# Patient Record
Sex: MALE | Race: WHITE | NOT HISPANIC OR LATINO | Employment: FULL TIME | URBAN - NONMETROPOLITAN AREA
[De-identification: names, ages, dates, MRNs, and addresses within clinical notes are randomized per-mention and may not be internally consistent; named-entity substitution may affect disease eponyms.]

---

## 2017-05-11 LAB — HCV AB SER-ACNC: NON REACTIVE

## 2023-04-03 ENCOUNTER — OFFICE VISIT (OUTPATIENT)
Dept: URGENT CARE | Facility: CLINIC | Age: 28
End: 2023-04-03

## 2023-04-03 VITALS
OXYGEN SATURATION: 98 % | HEART RATE: 92 BPM | HEIGHT: 69 IN | SYSTOLIC BLOOD PRESSURE: 121 MMHG | WEIGHT: 145 LBS | BODY MASS INDEX: 21.48 KG/M2 | DIASTOLIC BLOOD PRESSURE: 80 MMHG | TEMPERATURE: 97.4 F

## 2023-04-03 DIAGNOSIS — Z20.822 EXPOSURE TO COVID-19 VIRUS: ICD-10-CM

## 2023-04-03 DIAGNOSIS — B34.9 VIRAL SYNDROME: Primary | ICD-10-CM

## 2023-04-03 LAB
SARS-COV-2 AG UPPER RESP QL IA: NEGATIVE
VALID CONTROL: NORMAL

## 2023-04-03 NOTE — PATIENT INSTRUCTIONS
Rapid POC COVID testing negative  Continue with supportive measures, OTC Tylenol/Ibuprofen, nasal decongestants, and cough suppressants   Cool mist humidifiers, throat lozenges, salt gargles, honey, increased fluid intake and rest   Follow up with PCP in 3-5 days  Present to ER if symptoms worsen     COVID-19 (Coronavirus Disease 2019)   AMBULATORY CARE:   What you need to know about COVID-19:  COVID-19 is the disease caused by a coronavirus first discovered in December 2019  Coronaviruses generally cause upper respiratory (nose, throat, and lung) infections, such as a cold  The 2019 virus spreads quickly and easily  It can be spread starting 2 to 3 days before symptoms even begin  What you need to know about variants: The virus has changed into several new forms (called variants) since it was discovered  The variants may be more contagious (easily spread) than the original form  Some may also cause more severe illness than others  Signs and symptoms of COVID-19  may not develop  Signs and symptoms usually start about 5 days after infection but can take 2 to 14 days  You may feel like you have the flu or a bad cold  Some signs and symptoms go away in a few days  Others can last weeks, months, or possibly years  You may have any of the following:  A cough    Shortness of breath or trouble breathing that may become severe    A fever    Chills that might include shaking    Muscle pain, body aches, or a headache    A sore throat    Sudden changes or loss of your taste or smell    Feeling mentally and physically tired (fatigue)    Congestion (stuffy head and nose), or a runny nose    Diarrhea, nausea, or vomiting    Call your local emergency number (911 in the [facility]) if:   You have trouble breathing or shortness of breath at rest     You have chest pain or pressure that lasts longer than 5 minutes  You become confused or hard to wake  Your lips or face are blue      Seek care immediately if:   You have a fever of 104°F (40°C) or higher  Call your doctor if:   You have symptoms of COVID-19  You have questions or concerns about your condition or care  How COVID-19 is diagnosed: Your healthcare provider will examine you and ask about your symptoms  Tell your provider if you have any health conditions or are taking any medicines  Any of the following tests may be used:  A viral test  shows if you have a current infection  Some tests are available for you to do at home  Most use a nasal swab and give the result within 15 minutes  These tests are available at many pharmacy stores  Talk to your provider or pharmacist if you have any questions about this type of test  Testing sites are also available  A sample is taken from your nose or throat with a swab  You may need to quarantine until you get the results from a testing site  An antigen test  shows if you have a protein from the COVID-19 virus  This test is often called a rapid test because the results can be available in 30 minutes or less  An antibody test  shows if you had a recent or past infection  Blood samples are used for this test  Antibodies are made by your immune system to fight the virus that causes COVID-19  Antibodies form 1 to 3 weeks after you are infected  This test is not used to show if you are immune to the virus  A CT, MRI, ultrasound, or x-ray  may be used to check for complications of [id number]  These may include pneumonia, blood clots, or other complications  Treatment:   Mild symptoms  may get better on their own  Some treatments have emergency use authorization (EUA)  Examples include monoclonal antibodies and convalescent plasma  These may be given to help prevent worsening of your symptoms  You may also need any of the following:    Decongestants  help reduce nasal congestion and help you breathe more easily  If you take decongestant pills, they may make you feel restless or cause problems with your sleep   Do not use decongestant sprays for more than a few days  Cough suppressants  help reduce coughing  Ask your healthcare provider which type of cough medicine is best for you  To soothe a sore throat,  gargle with warm salt water, or use throat lozenges or a throat spray  Drink more liquids to thin and loosen mucus and to prevent dehydration  NSAIDs or acetaminophen  can help lower a fever and relieve body aches or a headache  Follow directions  If not taken correctly, NSAIDs can cause kidney damage and acetaminophen can cause liver damage  Severe or life-threatening symptoms  are treated in the hospital  You may need any of the following:     Medicines  may be given to fight the virus or treat inflammation  Blood thinners  help prevent or treat blood clots  If you have a deep vein thrombosis (DVT) or pulmonary embolism (PE), you may need to use blood thinners for at least 3 months  Extra oxygen  may be given if you have respiratory failure  This means your lungs cannot get enough oxygen into your blood and out to your organs  A ventilator  may be used to help you breathe  What you need to know about health problems the virus may cause: You may develop long-term health problems caused by the virus  Your risk is higher if you are 65 or older  A weak immune system, obesity, diabetes, chronic kidney disease, or a heart or lung condition can also increase your risk  Your risk is also higher if you are a current or former cigarette smoker   COVID-19 can lead to any of the following:  Multisymptom inflammatory syndrome in adults (MIS-A) or in children (MIS-C), causing inflammation in the heart, digestive system, skin, or brain    Shortness of breath, serious lower respiratory conditions, such as pneumonia or acute respiratory distress syndrome (ARDS)    Blood clots or blood vessel damage    Organ damage from a lack of oxygen or from blood clots    Sleep problems    Problems thinking clearly, remembering information, or concentrating    Mood changes, depression, or anxiety    Long-term problems tasting or smelling    Loss of appetite and weight loss    Nerve pain    Fatigue (feeling mentally and physically tired)    What you need to know about COVID-19 vaccines:  Healthcare providers recommend a COVID-19 vaccine, even if you have already had COVID-19  You are considered fully vaccinated against COVID-19 two weeks after the final dose of any COVID-19 vaccine  Let your healthcare provider know when you have received the final dose of the vaccine  Make a copy of your vaccination card  Keep the original with you in case you need to show it  Keep the copy in a safe place  Get a COVID-19 vaccine as directed  Vaccination is recommended for everyone 6 months or older  COVID-19 vaccines are given as a shot in 1 to 3 doses as a primary series  This depends on the vaccine brand and the age of the person who receives it  A booster dose is recommended for everyone 5 years or older after the primary series is complete  A second booster  is recommended for all adults 48 or older and for immunocompromised adolescents  The second booster is also recommended for anyone who got the 1-dose brand of vaccine for the first dose and a booster  Your provider can give you more information on boosters and help you schedule all needed doses  Continue social distancing and other measures  You can become infected even after you get the vaccine  You may also be able to pass the virus to others without knowing you are infected  After you get the vaccine, check local, national, and international travel rules  You may need to be tested before you travel  Some countries require proof of a negative test before you travel  You may also need to quarantine after you return  Medicine may be given to prevent infection  The medicine can be given if you are at high risk for infection and cannot get the vaccine   It can also be given if your immune system does not respond well to the vaccine  How the 2019 coronavirus spreads:   Droplets are the main way all coronaviruses spread  The virus travels in droplets that form when a person talks, sings, coughs, or sneezes  The droplets can also float in the air for minutes or hours  Infection happens when you breathe in the droplets or get them in your eyes or nose  Close personal contact with an infected person increases your risk for infection  This means being within 6 feet (2 meters) of the person for at least 15 minutes over 24 hours  Person-to-person contact can spread the virus  For example, a person with the virus on his or her hands can spread it by shaking hands with someone  The virus can stay on objects and surfaces for up to 3 days  You may become infected by touching the object or surface and then touching your eyes or mouth  Help lower the risk for COVID-19:   Wash your hands often throughout the day  Use soap and water  Rub your soapy hands together, lacing your fingers, for at least 20 seconds  Rinse with warm, running water  Dry your hands with a clean towel or paper towel  Use hand  that contains alcohol if soap and water are not available  Teach children how to wash their hands and use hand   Cover sneezes and coughs  Turn your face away and cover your mouth and nose with a tissue  Throw the tissue away  Use the bend of your arm if a tissue is not available  Then wash your hands well with soap and water or use hand   Teach children how to cover a cough or sneeze  Wear a face covering (mask) when needed  Use a cloth covering with at least 2 layers  You can also create layers by putting a cloth covering over a disposable non-medical mask  Cover your mouth and your nose  Follow worldwide, national, and local social distancing guidelines  Keep at least 6 feet (2 meters) between you and others  Try not to touch your face    If you get the virus on your hands, you can transfer it to your eyes, nose, or mouth and become infected  You can also transfer it to objects, surfaces, or people  Clean and disinfect high-touch surfaces and objects often  Use disinfecting wipes, or make a solution of 4 teaspoons of bleach in 1 quart (4 cups) of water  Ask about other vaccines you may need  Get the influenza (flu) vaccine as soon as recommended each year, usually starting in September or October  Get the pneumonia vaccine if recommended  Your healthcare provider can tell you if you should also get other vaccines, and when to get them  Follow social distancing guidelines:  National and local social distancing rules vary  Rules and restrictions may change over time as restrictions are lifted  The following are general guidelines:  Stay home if you are sick or think you may have COVID-19  It is important to stay home if you are waiting for a testing appointment or for test results  Avoid close physical contact with anyone who does not live in your home  Do not shake hands with, hug, or kiss a person as a greeting  If you must use public transportation (such as a bus or subway), try to sit or stand away from others  Wear your face covering  Avoid in-person gatherings and crowds  Attend virtually if possible  Follow up with your doctor as directed:  Write down your questions so you remember to ask them during your visits  For more information:   [facility]  [address] , [address]  Phone: 1- 569 - 533-2502  Web Address: [employer]anie    © Copyright [employer] 2022 Information is for End User's use only and may not be sold, redistributed or otherwise used for commercial purposes  The above information is an  only  It is not intended as medical advice for individual conditions or treatments   Talk to your doctor, nurse or pharmacist before following any medical regimen to see if it is safe and effective for you

## 2023-04-03 NOTE — PROGRESS NOTES
[facility]        NAME: Dilip Nam is a 33 y o  male  : 1995    MRN: [id number]  DATE: April 3, 2023  TIME: 12:56 PM    Assessment and Plan   Viral syndrome [B34 9]  1  Viral syndrome  Poct Covid 19 Rapid Antigen Test      2  Exposure to COVID-19 virus          Rapid POC COVID testing negative  COVID PCR declined by patient  Encouraged continued supportive measures  Follow up with PCP in 3-5 days or proceed to emergency department for worsening symptoms  Patient verbalized understanding of instructions given  Patient Instructions     Patient Instructions     Rapid POC COVID testing negative  Continue with supportive measures, OTC Tylenol/Ibuprofen, nasal decongestants, and cough suppressants   Cool mist humidifiers, throat lozenges, salt gargles, honey, increased fluid intake and rest   Follow up with PCP in 3-5 days  Present to ER if symptoms worsen     COVID-19 (Coronavirus Disease 2019)   AMBULATORY CARE:   What you need to know about COVID-19:  COVID-19 is the disease caused by a coronavirus first discovered in 2019  Coronaviruses generally cause upper respiratory (nose, throat, and lung) infections, such as a cold  The 2019 virus spreads quickly and easily  It can be spread starting 2 to 3 days before symptoms even begin  What you need to know about variants: The virus has changed into several new forms (called variants) since it was discovered  The variants may be more contagious (easily spread) than the original form  Some may also cause more severe illness than others  Signs and symptoms of COVID-19  may not develop  Signs and symptoms usually start about 5 days after infection but can take 2 to 14 days  You may feel like you have the flu or a bad cold  Some signs and symptoms go away in a few days  Others can last weeks, months, or possibly years   You may have any of the following:  • A cough    • Shortness of breath or trouble breathing that may become severe    • A fever    • Chills that might include shaking    • Muscle pain, body aches, or a headache    • A sore throat    • Sudden changes or loss of your taste or smell    • Feeling mentally and physically tired (fatigue)    • Congestion (stuffy head and nose), or a runny nose    • Diarrhea, nausea, or vomiting    Call your local emergency number (911 in the [address] [facility]) if:   • You have trouble breathing or shortness of breath at rest     • You have chest pain or pressure that lasts longer than 5 minutes  • You become confused or hard to wake  • Your lips or face are blue  Seek care immediately if:   • You have a fever of 104°F (40°C) or higher  Call your doctor if:   • You have symptoms of COVID-19  • You have questions or concerns about your condition or care  How COVID-19 is diagnosed: Your healthcare provider will examine you and ask about your symptoms  Tell your provider if you have any health conditions or are taking any medicines  Any of the following tests may be used:  • A viral test  shows if you have a current infection  Some tests are available for you to do at home  Most use a nasal swab and give the result within 15 minutes  These tests are available at many pharmacy stores  Talk to your provider or pharmacist if you have any questions about this type of test  Testing sites are also available  A sample is taken from your nose or throat with a swab  You may need to quarantine until you get the results from a testing site  • An antigen test  shows if you have a protein from the COVID-19 virus  This test is often called a rapid test because the results can be available in 30 minutes or less  • An antibody test  shows if you had a recent or past infection  Blood samples are used for this test  Antibodies are made by your immune system to fight the virus that causes COVID-19  Antibodies form 1 to 3 weeks after you are infected  This test is not used to show if you are immune to the virus      • A CT, MRI, ultrasound, or x-ray  may be used to check for complications of [id number]  These may include pneumonia, blood clots, or other complications  Treatment:   1  Mild symptoms  may get better on their own  Some treatments have emergency use authorization (EUA)  Examples include monoclonal antibodies and convalescent plasma  These may be given to help prevent worsening of your symptoms  You may also need any of the following:    ? Decongestants  help reduce nasal congestion and help you breathe more easily  If you take decongestant pills, they may make you feel restless or cause problems with your sleep  Do not use decongestant sprays for more than a few days  ? Cough suppressants  help reduce coughing  Ask your healthcare provider which type of cough medicine is best for you  ? To soothe a sore throat,  gargle with warm salt water, or use throat lozenges or a throat spray  Drink more liquids to thin and loosen mucus and to prevent dehydration  ? NSAIDs or acetaminophen  can help lower a fever and relieve body aches or a headache  Follow directions  If not taken correctly, NSAIDs can cause kidney damage and acetaminophen can cause liver damage  2  Severe or life-threatening symptoms  are treated in the hospital  You may need any of the following:     ? Medicines  may be given to fight the virus or treat inflammation  ? Blood thinners  help prevent or treat blood clots  If you have a deep vein thrombosis (DVT) or pulmonary embolism (PE), you may need to use blood thinners for at least 3 months  ? Extra oxygen  may be given if you have respiratory failure  This means your lungs cannot get enough oxygen into your blood and out to your organs  ? A ventilator  may be used to help you breathe  What you need to know about health problems the virus may cause: You may develop long-term health problems caused by the virus  Your risk is higher if you are 65 or older   A weak immune system, obesity, diabetes, chronic kidney disease, or a heart or lung condition can also increase your risk  Your risk is also higher if you are a current or former cigarette smoker  COVID-19 can lead to any of the following:  • Multisymptom inflammatory syndrome in adults (MIS-A) or in children (MIS-C), causing inflammation in the heart, digestive system, skin, or brain    • Shortness of breath, serious lower respiratory conditions, such as pneumonia or acute respiratory distress syndrome (ARDS)    • Blood clots or blood vessel damage    • Organ damage from a lack of oxygen or from blood clots    • Sleep problems    • Problems thinking clearly, remembering information, or concentrating    • Mood changes, depression, or anxiety    • Long-term problems tasting or smelling    • Loss of appetite and weight loss    • Nerve pain    • Fatigue (feeling mentally and physically tired)    What you need to know about COVID-19 vaccines:  Healthcare providers recommend a COVID-19 vaccine, even if you have already had COVID-19  You are considered fully vaccinated against COVID-19 two weeks after the final dose of any COVID-19 vaccine  Let your healthcare provider know when you have received the final dose of the vaccine  Make a copy of your vaccination card  Keep the original with you in case you need to show it  Keep the copy in a safe place  • Get a COVID-19 vaccine as directed  Vaccination is recommended for everyone 6 months or older  COVID-19 vaccines are given as a shot in 1 to 3 doses as a primary series  This depends on the vaccine brand and the age of the person who receives it  A booster dose is recommended for everyone 5 years or older after the primary series is complete  A second booster  is recommended for all adults 48 or older and for immunocompromised adolescents  The second booster is also recommended for anyone who got the 1-dose brand of vaccine for the first dose and a booster   Your provider can give you more information on boosters and help you schedule all needed doses  • Continue social distancing and other measures  You can become infected even after you get the vaccine  You may also be able to pass the virus to others without knowing you are infected  • After you get the vaccine, check local, national, and international travel rules  You may need to be tested before you travel  Some countries require proof of a negative test before you travel  You may also need to quarantine after you return  • Medicine may be given to prevent infection  The medicine can be given if you are at high risk for infection and cannot get the vaccine  It can also be given if your immune system does not respond well to the vaccine  How the 2019 coronavirus spreads:   • Droplets are the main way all coronaviruses spread  The virus travels in droplets that form when a person talks, sings, coughs, or sneezes  The droplets can also float in the air for minutes or hours  Infection happens when you breathe in the droplets or get them in your eyes or nose  Close personal contact with an infected person increases your risk for infection  This means being within 6 feet (2 meters) of the person for at least 15 minutes over 24 hours  • Person-to-person contact can spread the virus  For example, a person with the virus on his or her hands can spread it by shaking hands with someone  • The virus can stay on objects and surfaces for up to 3 days  You may become infected by touching the object or surface and then touching your eyes or mouth  Help lower the risk for COVID-19:   • Wash your hands often throughout the day  Use soap and water  Rub your soapy hands together, lacing your fingers, for at least 20 seconds  Rinse with warm, running water  Dry your hands with a clean towel or paper towel  Use hand  that contains alcohol if soap and water are not available  Teach children how to wash their hands and use hand   • Cover sneezes and coughs  Turn your face away and cover your mouth and nose with a tissue  Throw the tissue away  Use the bend of your arm if a tissue is not available  Then wash your hands well with soap and water or use hand   Teach children how to cover a cough or sneeze  • Wear a face covering (mask) when needed  Use a cloth covering with at least 2 layers  You can also create layers by putting a cloth covering over a disposable non-medical mask  Cover your mouth and your nose  • Follow worldwide, national, and local social distancing guidelines  Keep at least 6 feet (2 meters) between you and others  • Try not to touch your face  If you get the virus on your hands, you can transfer it to your eyes, nose, or mouth and become infected  You can also transfer it to objects, surfaces, or people  • Clean and disinfect high-touch surfaces and objects often  Use disinfecting wipes, or make a solution of 4 teaspoons of bleach in 1 quart (4 cups) of water  • Ask about other vaccines you may need  Get the influenza (flu) vaccine as soon as recommended each year, usually starting in September or October  Get the pneumonia vaccine if recommended  Your healthcare provider can tell you if you should also get other vaccines, and when to get them  Follow social distancing guidelines:  National and local social distancing rules vary  Rules and restrictions may change over time as restrictions are lifted  The following are general guidelines:  • Stay home if you are sick or think you may have COVID-19  It is important to stay home if you are waiting for a testing appointment or for test results  • Avoid close physical contact with anyone who does not live in your home  Do not shake hands with, hug, or kiss a person as a greeting  If you must use public transportation (such as a bus or subway), try to sit or stand away from others  Wear your face covering      • Avoid in-person gatherings and crowds  Attend virtually if possible  Follow up with your doctor as directed:  Write down your questions so you remember to ask them during your visits  For more information:   • Centers for Disease Control and Prevention  [address] , [address]  Phone: 0- 290 - 585-4029  Web Address: [employer] janie    © Copyright [employer] 2022 Information is for End User's use only and may not be sold, redistributed or otherwise used for commercial purposes  The above information is an  only  It is not intended as medical advice for individual conditions or treatments  Talk to your doctor, nurse or pharmacist before following any medical regimen to see if it is safe and effective for you  Chief Complaint     Chief Complaint   Patient presents with   • Sore Throat     C/O sore throat which is minor and body aches, cough  Onset last PM  Wife tested positive for Covid today  History of Present Illness       15-year-old male past medical history significant for anxiety and depression presents with complaints of nasal congestion, sore throat, cough, and body aches x2 days  Denies fever, vomiting, or diarrhea  He has not been taking any medications for his symptoms  Patient states positive sick contact/exposure as wife tested positive for COVID this morning  Review of Systems   Review of Systems   Constitutional: Negative for fever  HENT: Positive for congestion, rhinorrhea and sore throat  Negative for ear discharge, ear pain, trouble swallowing and voice change  Eyes: Negative for discharge  Respiratory: Positive for cough  Negative for shortness of breath and wheezing  Cardiovascular: Negative for chest pain  Gastrointestinal: Negative for abdominal pain, diarrhea, nausea and vomiting  Musculoskeletal: Positive for myalgias  Skin: Negative for rash           Current Medications       Current Outpatient Medications:   •  NON FORMULARY, "Medical marijuana, Disp: , Rfl:     Current Allergies     Allergies as of 04/03/2023   • (No Known Allergies)            The following portions of the patient's history were reviewed and updated as appropriate: allergies, current medications, past family history, past medical history, past social history, past surgical history and problem list      Past Medical History:   Diagnosis Date   • Anxiety    • Depression        Past Surgical History:   Procedure Laterality Date   • APPENDECTOMY         Family History   Problem Relation Age of Onset   • No Known Problems Mother    • No Known Problems Father          Medications have been verified  Objective   /80   Pulse 92   Temp (!) 97 4 °F (36 3 °C)   Ht 5' 9\" (1 753 m)   Wt 65 8 kg (145 lb)   SpO2 98%   BMI 21 41 kg/m²   No LMP for male patient  Physical Exam     Physical Exam  Vitals and nursing note reviewed  Constitutional:       General: He is not in acute distress  Appearance: He is not toxic-appearing  HENT:      Head: Normocephalic  Right Ear: Tympanic membrane, ear canal and external ear normal       Left Ear: Tympanic membrane, ear canal and external ear normal       Nose: Congestion present  Mouth/Throat:      Mouth: Mucous membranes are moist       Pharynx: Posterior oropharyngeal erythema present  Tonsils: No tonsillar exudate  Eyes:      Conjunctiva/sclera: Conjunctivae normal    Cardiovascular:      Rate and Rhythm: Normal rate and regular rhythm  Heart sounds: Normal heart sounds  Pulmonary:      Effort: Pulmonary effort is normal  No respiratory distress  Breath sounds: Normal breath sounds  No stridor  No wheezing, rhonchi or rales  Lymphadenopathy:      Cervical: No cervical adenopathy  Skin:     General: Skin is warm and dry  Neurological:      Mental Status: He is alert and oriented to person, place, and time  Gait: Gait is intact     Psychiatric:         Mood and Affect: Mood " normal          Behavior: Behavior normal

## 2023-04-03 NOTE — LETTER
April 3, 2023     Patient: Zari Cowart   YOB: 1995   Date of Visit: 4/3/2023       To Whom it May Concern:    Zari Cowart was seen in my clinic on 4/3/2023  He may return to work on 4/4/2023  If you have any questions or concerns, please don't hesitate to call           Sincerely,          HAILEY Cruz        CC: No Recipients

## 2023-05-05 ENCOUNTER — OFFICE VISIT (OUTPATIENT)
Dept: FAMILY MEDICINE CLINIC | Facility: CLINIC | Age: 28
End: 2023-05-05

## 2023-05-05 VITALS
BODY MASS INDEX: 22.09 KG/M2 | HEIGHT: 68 IN | SYSTOLIC BLOOD PRESSURE: 127 MMHG | HEART RATE: 78 BPM | TEMPERATURE: 98.1 F | WEIGHT: 145.72 LBS | OXYGEN SATURATION: 98 % | DIASTOLIC BLOOD PRESSURE: 70 MMHG

## 2023-05-05 DIAGNOSIS — Z11.4 SCREENING FOR HIV (HUMAN IMMUNODEFICIENCY VIRUS): ICD-10-CM

## 2023-05-05 DIAGNOSIS — Z30.2 REQUEST FOR STERILIZATION: ICD-10-CM

## 2023-05-05 DIAGNOSIS — Z00.00 ANNUAL PHYSICAL EXAM: Primary | ICD-10-CM

## 2023-05-05 DIAGNOSIS — M62.830 MUSCLE SPASM OF BACK: ICD-10-CM

## 2023-05-05 DIAGNOSIS — Z11.59 NEED FOR HEPATITIS C SCREENING TEST: ICD-10-CM

## 2023-05-05 RX ORDER — CYCLOBENZAPRINE HCL 10 MG
10 TABLET ORAL 3 TIMES DAILY PRN
Qty: 30 TABLET | Refills: 0 | Status: SHIPPED | OUTPATIENT
Start: 2023-05-05 | End: 2023-05-15

## 2023-05-05 NOTE — PROGRESS NOTES
Assessment/Plan:       1  Annual physical exam    2  Request for sterilization  -     Ambulatory Referral to Urology; Future; Expected date: 08/05/2023    3  Need for hepatitis C screening test  -     Hepatitis C Antibody; Future    4  Screening for HIV (human immunodeficiency virus)  -     HIV 1/2 AG/AB w Reflex [facility] for 2 yr old and above; Future    5  Muscle spasm of back  -     cyclobenzaprine (FLEXERIL) 10 mg tablet; Take 1 tablet (10 mg total) by mouth 3 (three) times a day as needed for muscle spasms for up to 10 days    This 52-year-old male is establishing care  Overall, he is quite satisfied with his health  He was seen in urgent care last month due to a viral syndrome which he recovered from without sequela  He is  for 4 years and his 3year-old daughter and an 6year-old stepdaughter  He and his wife have talked about whether further children are in their plans and at this time, he would like to pursue vasectomy  Referral for urologic intervention has been made  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  He has a tournament this weekend after placing in medical rounds for the last 2 tournaments  This is a scholastic wrestling type of tournament that is based on both age and weight classification  He has been training for this for the last several months and the tournament this weekend is in [place]  Patient is having some pain on the left side of his back  He feels a muscle spasm that had some radiation into the left posterior thigh  He received a therapeutic massage the previous day and was told that his pyriformis muscle was involved  The therapeutic massage did help but he went into spasm soon after the examination was completed  He is interested in having muscle relaxants to take help him get sleep as the muscle spasm has been waking him up  Patient denies any further abdominal pain or GERD    He is willing to undergo testing for hepatitis C and HIV based on the "primary risk factor being wrestling and possible blood exchange  He has no further complaints at this time  His weight is normal and his blood pressure is well controlled  A total of 35 minutes was spent rendering care for this patient  This time included review of the patient's electronic medical record, performing the history and physical, reviewing appropriate labs and/or images, developing a treatment and assessment plan, answering patient's questions and concerns, and documenting the patient visit  Subjective:      Patient ID: Francis Waters is a 33 y o  male  HPI: 72-year-old male being seen to establish care  Physical examination was completed  He does wear contacts but has had no vision changes  No ear pain sinus pressure difficulty swallowing or any seasonal allergies  No chest pain heart palpitations dizziness or lightheadedness  No changes in his bowel or bladder habits  No melena hematochezia  No further diarrhea or constipation  Does have pain on the left side of his back with a muscle spasm noted  Pain is responding to therapeutic massage and stretching  He is staying well-hydrated in order to lower the threshold for muscle spasm  He is not having any saddle anesthesia, changes in bowel or bladder habits  No acute injury was noted  Similar injury 2 to 3 years ago  The following portions of the patient's history were reviewed and updated as appropriate: allergies, current medications, past family history, past medical history, past social history, past surgical history, and problem list     Review of Systems  No recent URI or viral syndrome  Had COVID a year ago after traveling to [facility]  Recovered uneventfully      Objective:      /70 (BP Location: Right arm, Patient Position: Sitting, Cuff Size: Standard)   Pulse 78   Temp 98 1 °F (36 7 °C) (Tympanic)   Ht 5' 8\" (1 727 m)   Wt 66 1 kg (145 lb 11 6 oz)   SpO2 98%   BMI 22 16 kg/m²          " Physical Exam  Well-developed well-nourished 33y o  year old male who is cooperative with the exam   Patient is alert and oriented x3  Patient is appropriate in answering all questions  HEENT:  Normocephalic  PERRLA  EOMs intact  TMs are clear with identification of bony landmarks  No tragus or pinnae tenderness  No pre or posterior auricular adenopathy  Sinuses without tenderness  Throat without hyperemia  Neck:  Supple without adenopathy  Thyroid midline without thyromegaly or bruits  No carotid bruits  Chest symmetric and nontender  Heart regular rate and rhythm  No murmur rubs or gallops  Point of maximum impulse not displaced  Lungs are clear to auscultation  Breathing is nonlabored  Aerating bases well  Abdomen round and soft positive bowel sounds without masses tenderness or organomegaly  Extremities reveal adequate peripheral pulses without peripheral edema  MSK: Patient has a tender area on the lower lumbar paraspinal muscles on the left side  Muscle spasm was noted in this area  He was able to bend over and touch his toes without exacerbating his symptoms  No other areas of muscle tenderness or spasm noted on the low back

## 2023-05-05 NOTE — PATIENT INSTRUCTIONS
Is here to establish care  He is in excellent health  Blood pressure is well controlled  He is agreeing to HIV and hepatitis C screening as he does wrestle and there could be some blood contamination as part of that activity  He is very active he is going to be wrestling in a progressively more difficult wrestling tournament this weekend  He was a high school wrestler  He does not smoke use alcohol or other drugs and maintains a healthy lifestyle with a healthy weight  I will see him in 1 year or sooner if his condition warrants  Referral for vasectomy completed

## 2023-05-10 ENCOUNTER — TELEPHONE (OUTPATIENT)
Dept: UROLOGY | Facility: AMBULATORY SURGERY CENTER | Age: 28
End: 2023-05-10

## 2023-05-10 NOTE — TELEPHONE ENCOUNTER
New Patient    What is the reason for the patient’s appointment?: Patient being referred for VAS consult     What office location does the patient prefer?: [facility]    Does patient have Imaging/Lab Results: n/a    Have patient records been requested?:  If No, are the records showing in Epic:       INSURANCE:  Do we accept the patient's insurance or is the patient Self-Pay?: Yes    Insurance Provider: [place] [id number] or [facility]  Plan Type/Number:  Member ID#:       HISTORY:   Has the patient had any previous Urologist(s)?: No    Was the patient seen in the ED?: No    Has the patient had any outside testing done?: n/a    Does the patient have a personal history of cancer?: No      Patient scheduled on 6/28 at 1 pm with Dr Alli Peterson in [facility]

## 2023-08-04 ENCOUNTER — OFFICE VISIT (OUTPATIENT)
Dept: UROLOGY | Facility: CLINIC | Age: 28
End: 2023-08-04
Payer: COMMERCIAL

## 2023-08-04 VITALS
WEIGHT: 153 LBS | HEART RATE: 60 BPM | TEMPERATURE: 98 F | OXYGEN SATURATION: 100 % | BODY MASS INDEX: 23.19 KG/M2 | SYSTOLIC BLOOD PRESSURE: 110 MMHG | DIASTOLIC BLOOD PRESSURE: 60 MMHG | HEIGHT: 68 IN

## 2023-08-04 DIAGNOSIS — Z30.2 REQUEST FOR STERILIZATION: ICD-10-CM

## 2023-08-04 DIAGNOSIS — Z30.2 ENCOUNTER FOR STERILIZATION: Primary | ICD-10-CM

## 2023-08-04 PROCEDURE — 99203 OFFICE O/P NEW LOW 30 MIN: CPT | Performed by: UROLOGY

## 2023-08-04 NOTE — PROGRESS NOTES
UROLOGY PROGRESS NOTE         NAME: Tania Raman  AGE: 33 y.o. SEX: male  : 1995   MRN: [id number]    DATE: 2023  TIME: 9:43 AM    Assessment and Plan      Impression:   1. Encounter for sterilization    2. Request for sterilization  -     Ambulatory Referral to Urology         Plan: Patient  with 2 children and 1 on the way. He is going to wait till after his wife delivers to proceed with vasectomy. The procedures risks imitations outcomes permanency along with the need to continue contraception until we see to negative semen analyses were discussed and understood. Informed consent was obtained. His questions were answered. Normal exam today. Chief Complaint     Chief Complaint   Patient presents with   • vasectomy consult     History of Present Illness     HPI: Tania Raman is a 33y.o. year old male who presents with desire for elective sterilization. Patient is  has 2 children and 1 on the way. Wife is about 6 weeks pregnant presently. Patient works at [employer] as a supervisor. The following portions of the patient's history were reviewed and updated as appropriate: allergies, current medications, past family history, past medical history, past social history, past surgical history and problem list.  Past Medical History:   Diagnosis Date   • Anxiety    • Depression      Past Surgical History:   Procedure Laterality Date   • APPENDECTOMY       shoulder  Review of Systems     Const: Denies chills, fever and weight loss. CV: Denies chest pain. Resp: Denies SOB. GI: Denies abdominal pain, nausea and vomiting. : Denies symptoms other than stated above. Musculo: Denies back pain. Objective   /60 (BP Location: Left arm, Patient Position: Sitting, Cuff Size: Adult)   Pulse 60   Temp 98 °F (36.7 °C)   Ht 5' 8" (1.727 m)   Wt 69.4 kg (153 lb)   SpO2 100%   BMI 23.26 kg/m²     Physical Exam  Const: Appears healthy and well developed.  No signs of acute distress present. Resp: Respirations are regular and unlabored. CV: Rate is regular. Rhythm is regular. Abdomen: Abdomen is soft, nontender, and nondistended. Kidneys are not palpable. : Normal testes epididymis and bilateral vas. Bilateral vasa are normal and accessible no hernias. No testicular mass. Psych: Patient's attitude is cooperative.  Mood is normal. Affect is normal.    Procedure   Procedures     Current Medications     Current Outpatient Medications:   •  NON FORMULARY, Medical marijuana, Disp: , Rfl:   •  cyclobenzaprine (FLEXERIL) 10 mg tablet, Take 1 tablet (10 mg total) by mouth 3 (three) times a day as needed for muscle spasms for up to 10 days, Disp: 30 tablet, Rfl: 0        Jef Beasley MD

## 2023-11-10 ENCOUNTER — OFFICE VISIT (OUTPATIENT)
Dept: URGENT CARE | Facility: CLINIC | Age: 28
End: 2023-11-10
Payer: COMMERCIAL

## 2023-11-10 VITALS
OXYGEN SATURATION: 99 % | SYSTOLIC BLOOD PRESSURE: 110 MMHG | DIASTOLIC BLOOD PRESSURE: 78 MMHG | RESPIRATION RATE: 18 BRPM | TEMPERATURE: 97.8 F | HEART RATE: 74 BPM

## 2023-11-10 DIAGNOSIS — M54.42 ACUTE LEFT-SIDED LOW BACK PAIN WITH LEFT-SIDED SCIATICA: Primary | ICD-10-CM

## 2023-11-10 PROCEDURE — 99213 OFFICE O/P EST LOW 20 MIN: CPT

## 2023-11-10 NOTE — PROGRESS NOTES
[facility]        NAME: Michell Galloway is a 33 y.o. male  : 1995    MRN: [id number]  DATE: November 10, 2023  TIME: 6:08 PM    Assessment and Plan   Acute left-sided low back pain with left-sided sciatica [M54.42]  1. Acute left-sided low back pain with left-sided sciatica  Ambulatory Referral to Physical Therapy        Discussed problem with patient. No red flag signs on PE. Having persistent lower back pain complaints despite conservative treatment. Advised patient he should be using ibuprofen and discussed max dosages for pain complaints as well as ice and heat but also placing referral for physical therapy for follow-up. Should monitor for red flag symptoms or report to the ER's experiencing. Patient Instructions       Follow up with PCP in 3-5 days. Proceed to  ER if symptoms worsen. Chief Complaint     Chief Complaint   Patient presents with   • Back Pain     C/o lower back pain radiating down into left side of buttocks since April that has worsened         History of Present Illness       C/o lower back pain radiating down into left side of buttocks since April that has worsened. Using stretching. Describes pain as a lot of cramping. Denies any injury or trauma. At rest, 6/10 while sitting. Laying on back is helpful. No prior surgeries to the back. Denies any numbness, weakness, tingling. No fevers, no IV drug use, no bilateral lower extremity weakness, urinary or fecal incontinence, no saddle anesthesia, no regular steroid use, no trauma. Back Pain  Pertinent negatives include no chest pain, fever or headaches. Review of Systems   Review of Systems   Constitutional:  Negative for appetite change, chills, fatigue and fever. Respiratory:  Negative for cough, shortness of breath, wheezing and stridor. Cardiovascular:  Negative for chest pain and palpitations. Musculoskeletal:  Positive for back pain.    Neurological:  Negative for dizziness, syncope, light-headedness and headaches. Current Medications       Current Outpatient Medications:   •  cyclobenzaprine (FLEXERIL) 10 mg tablet, Take 1 tablet (10 mg total) by mouth 3 (three) times a day as needed for muscle spasms for up to 10 days, Disp: 30 tablet, Rfl: 0  •  NON FORMULARY, Medical marijuana, Disp: , Rfl:     Current Allergies     Allergies as of 11/10/2023   • (No Known Allergies)            The following portions of the patient's history were reviewed and updated as appropriate: allergies, current medications, past family history, past medical history, past social history, past surgical history and problem list.     Past Medical History:   Diagnosis Date   • Anxiety    • Depression        Past Surgical History:   Procedure Laterality Date   • APPENDECTOMY         Family History   Problem Relation Age of Onset   • No Known Problems Mother    • No Known Problems Father          Medications have been verified. Objective   /78   Pulse 74   Temp 97.8 °F (36.6 °C)   Resp 18   SpO2 99%        Physical Exam     Physical Exam  Vitals and nursing note reviewed. Constitutional:       General: He is not in acute distress. Appearance: Normal appearance. He is normal weight. He is not ill-appearing, toxic-appearing or diaphoretic. Cardiovascular:      Rate and Rhythm: Normal rate and regular rhythm. Pulses: Normal pulses. Heart sounds: Normal heart sounds. No murmur heard. No friction rub. No gallop. Pulmonary:      Effort: Pulmonary effort is normal. No respiratory distress. Breath sounds: Normal breath sounds. No stridor. No wheezing, rhonchi or rales. Chest:      Chest wall: No tenderness. Musculoskeletal:      Lumbar back: Tenderness present. No swelling, edema, deformity, signs of trauma, lacerations, spasms or bony tenderness. Normal range of motion. Negative right straight leg raise test and negative left straight leg raise test. No scoliosis.       Comments: Left-sided lower back tenderness. Positive SI joint tenderness. Full range of motion using lower back with mild pain complaints. Patient states pain is worse with lower back flexion. No overlying skin changes or deformities. No lifts or step-offs. 5 out of 5 hip strength. +2 DP and PT pulses which are equal.  Denies any saddle anesthesia. Neurological:      Mental Status: He is alert.

## 2023-11-28 ENCOUNTER — EVALUATION (OUTPATIENT)
Dept: PHYSICAL THERAPY | Facility: CLINIC | Age: 28
End: 2023-11-28
Payer: COMMERCIAL

## 2023-11-28 DIAGNOSIS — M54.42 ACUTE LEFT-SIDED LOW BACK PAIN WITH LEFT-SIDED SCIATICA: ICD-10-CM

## 2023-11-28 PROCEDURE — 97161 PT EVAL LOW COMPLEX 20 MIN: CPT | Performed by: PHYSICAL THERAPIST

## 2023-11-28 PROCEDURE — 97110 THERAPEUTIC EXERCISES: CPT | Performed by: PHYSICAL THERAPIST

## 2023-11-28 NOTE — PROGRESS NOTES
PT Evaluation     Today's date: 2023  Patient name: Reema Gallo  : 1995  MRN: [id number]  Referring provider: Helen Willams  Dx:   Encounter Diagnosis     ICD-10-CM    1. Acute left-sided low back pain with left-sided sciatica  M54.42 Ambulatory Referral to Physical Therapy                     Assessment  Assessment details: Patient is a 33 y.o. male who presents to PT with low back pain and LLE neural tension which has increased since May of this year. Patient has tried independent stretching and foam rolling which has been somewhat helpful however symptoms continue to be present. Patient has poor pelvic awareness and difficulty isolating his transverse abdominus and multifidus. Patient does have a positive SLR and slump test with neural tension present on the LLE. Patient was instructed with a HEP this date. Impairments: abnormal or restricted ROM, activity intolerance, impaired physical strength and pain with function    Symptom irritability: moderateUnderstanding of Dx/Px/POC: excellent  Goals  STG - 2 weeks    Patient to report pain at worst 3/10 with daily activity. Patient to have increased sciatic mobility to less than 25% limitation  Patient to be independent with HEP. LTG - 6 weeks  Patient able to demonstrate painfree AROM fo the L/S. Patient able to demonstrate good posture in sitting and standing. Patient able to demonstrate proper body mechanics for lifting and carrying items. Patient to demonstrate core strength at  5/5 . Patient to be independent with final HEP. Plan  Plan details: Patient's evaluation is completed. Patient was instructed with a HEP this date. Patient has scheduled further PT sessions for treatment.     Patient would benefit from: skilled physical therapy  Planned modality interventions: TENS, thermotherapy: hydrocollator packs and cryotherapy  Planned therapy interventions: manual therapy, neuromuscular re-education, therapeutic activities, therapeutic exercise and home exercise program  Frequency: 1x week  Duration in weeks: 8  Plan of Care beginning date: 2023  Plan of Care expiration date: 2024  Treatment plan discussed with: patient      Subjective Evaluation    History of Present Illness  Mechanism of injury: Patient notes that he started working in a warehouse which resulted in some stiffness symptoms in the L/S region which have increased since May. He notes that he was training for a wrestling tournament with pain on the left mostly and feels as if his pelvis is off. He notes he feels like his spine might snap backwards some times. Patient notes that he works about 10 our shifts. He notes pain in the morning. He notes he went for massage that was somewhat helpful and so has been using a foam roller on the left gluteal and piriformis. He responded well to a foam roller. He notes he is taking ibuprofen to help with pain. He notes that he is taking 800 mg at most three times per day. Patient Goals  Patient goals for therapy: decreased pain, increased strength and increased motion  Patient goal: HEP instruction  Pain  Current pain ratin  At best pain ratin  At worst pain ratin  Location: left glute with radiating symptoms in the foot  Quality: tight, throbbing and dull ache  Relieving factors: heat and medications  Aggravating factors: sitting    Social Support  Steps to enter house: yes  Stairs in house: yes   Lives in: multiple-level home  Lives with: spouse    Employment status: working    Objective     Concurrent Complaints  Negative for night pain, disturbed sleep, bladder dysfunction and bowel dysfunction    Postural Observations  Seated posture: fair  Standing posture: fair  Correction of posture: has no consistent effect      Palpation   Left   Tenderness of the erector spinae, lumbar paraspinals and quadratus lumborum. Right   Tenderness of the erector spinae and lumbar paraspinals.      Neurological Testing Sensation     Lumbar   Left   Intact: light touch    Right   Intact: light touch    Active Range of Motion   Cervical/Thoracic Spine       Thoracic    Flexion:  Restriction level: moderate  Extension:  WFL  Left lateral flexion:  WFL  Right lateral flexion:  WFL  Left rotation:  WFL  Right rotation:  [facility]    Lumbar   Left lateral flexion:  WFL  Right lateral flexion:  WFL  Left rotation:  WFL  Right rotation:  [facility]    Joint Play     Hypermobile: T11, T12, L1, L2, L3, L4, L5 and S1     Strength/Myotome Testing     Lumbar   Left   Heel walk: normal  Toe walk: normal    Right   Heel walk: normal  Toe walk: normal    Left Hip   Planes of Motion   Flexion: 4  Extension: 4  Abduction: 4+  Adduction: 4+  External rotation: 4+  Internal rotation: 4+    Right Hip   Planes of Motion   Flexion: 4+  Extension: 4+  Abduction: 4+  Adduction: 4+  External rotation: 4+  Internal rotation: 4+    Left Knee   Flexion: 4+  Extension: 4+    Right Knee   Flexion: 4+  Extension: 4+    Left Ankle/Foot   Dorsiflexion: 4+  Plantar flexion: 4+    Right Ankle/Foot   Dorsiflexion: 4+  Plantar flexion: 4+    Muscle Activation   Patient able to activate left transverse abdominals, left multifidus, right transverse abdominals and right multifidus. Tests     Lumbar   Negative SIJ compression. Left   Positive passive SLR and slump test.     Right   Negative passive SLR and slump test.     Left Hip   Negative MEGAN and FADIR. Right Hip   Negative MEGAN and FADIR.               Precautions: NONE     Daily Treatment Diary:      Initial Evaluation Date: 11/28/23  Compliance 11/28                     Visit Number 1                    Re-Eval  IE                 [facility] Captured Y                           11/28                     Manual                                                                                        Ther-Ex                      TA with PPT x10                     TA with SLR x10                     Add with bridge x10                     Piriformis stretch 20" x4                     Sciatic neural glide X10 3" hold                                           Prone hip ext x10                     Multifidus set                      Squats                      Elliptical                      Neuro Re-Ed                      Cat/ cow                      Bird dog x10            Paloff Press             Dying bug                          Ther-Act                                                               Modalities                                                                          Access Code: [email or web address]  URL: [email or web address]  Date: 11/28/2023  Prepared by: Krystyna Márquez    Exercises  - Supine Figure 4 Piriformis Stretch  - 1 x daily - 7 x weekly - 1 sets - 5 reps - 30 hold  - Seated Piriformis Stretch with Trunk Bend  - 1 x daily - 7 x weekly - 1 sets - 5 reps - 30 hold  - Supine Sciatic Nerve Glide  - 1 x daily - 7 x weekly - 2 sets - 10 reps - 3-5 hold  - Piriformis Mobilization on Foam Roll  - 1 x daily - 7 x weekly - 1 sets - 10 reps  - Supine Posterior Pelvic Tilt  - 1 x daily - 7 x weekly - 2 sets - 10 reps  - Small Range Straight Leg Raise  - 1 x daily - 7 x weekly - 2 sets - 10 reps  - Supine Bridge with Mini Swiss Ball Between Knees  - 1 x daily - 7 x weekly - 2 sets - 10 reps  - Prone Hip Extension  - 1 x daily - 7 x weekly - 2 sets - 10 reps  - Bird Dog  - 1 x daily - 7 x weekly - 1 sets - 10 reps  - Seated Sciatic Tensioner  - 1 x daily - 7 x weekly - 1 sets - 10 reps - 3-5 hold

## 2023-12-04 ENCOUNTER — APPOINTMENT (OUTPATIENT)
Dept: PHYSICAL THERAPY | Facility: CLINIC | Age: 28
End: 2023-12-04
Payer: COMMERCIAL

## 2023-12-05 ENCOUNTER — OFFICE VISIT (OUTPATIENT)
Dept: PHYSICAL THERAPY | Facility: CLINIC | Age: 28
End: 2023-12-05
Payer: COMMERCIAL

## 2023-12-05 DIAGNOSIS — M54.42 ACUTE LEFT-SIDED LOW BACK PAIN WITH LEFT-SIDED SCIATICA: Primary | ICD-10-CM

## 2023-12-05 PROCEDURE — 97112 NEUROMUSCULAR REEDUCATION: CPT | Performed by: PHYSICAL THERAPIST

## 2023-12-05 PROCEDURE — 97110 THERAPEUTIC EXERCISES: CPT | Performed by: PHYSICAL THERAPIST

## 2023-12-05 NOTE — PROGRESS NOTES
Daily Note     Today's date: 2023  Patient name: Génesis Byrne  : 1995  MRN: [id number]  Referring provider: Jose A Gutierrez  Dx:   Encounter Diagnosis     ICD-10-CM    1. Acute left-sided low back pain with left-sided sciatica  M54.42                      Subjective: Patient notes he does have minor stiffness this afternoon but is doing well overall this date. He notes compliance with his HEP as well. Patient notes that sitting is still the most uncomfortable position. Objective: See treatment diary below      Assessment: Tolerated treatment well. Patient would benefit from continued PT. Patient able to progress into core stabilization exercise. Patient does continue to demonstrate LLE neural tension symptoms. Plan: Continue per plan of care.       Precautions: NONE     Daily Treatment Diary:      Initial Evaluation Date: 23  Compliance                    Visit Number 1 2                   Re-Eval  IE --                MC   Foto Captured Y -                                             Manual                                                                                        Ther-Ex                      TA with PPT x10  1 min                   TA with SLR x10  x15                   Add with bridge x10  on p-ball x15                   Piriformis stretch 20" x4  20"x4                   Sciatic neural glide X10 3" hold  x10 3 sec/ x5 10" hold                   LTR   5" hold x10                   Prone hip ext x10  x10                   Multifidus set                      Prone press up  10" x5           [place] - Lat pull down   20# 2x10                   Upright bike   8 min                   Neuro Re-Ed                      Cat/ cow   x10                   Bird dog x10 x10           Paloff Press  Blue x15           Dying bug  With p-ball x10           Prone alt UE/LE lift  x10           Ther-Act Modalities                                                                          Access Code: [email or web address]  URL: [email or web address]  Date: 11/28/2023  Prepared by: Author Chucho    Exercises  - Supine Figure 4 Piriformis Stretch  - 1 x daily - 7 x weekly - 1 sets - 5 reps - 30 hold  - Seated Piriformis Stretch with Trunk Bend  - 1 x daily - 7 x weekly - 1 sets - 5 reps - 30 hold  - Supine Sciatic Nerve Glide  - 1 x daily - 7 x weekly - 2 sets - 10 reps - 3-5 hold  - Piriformis Mobilization on Foam Roll  - 1 x daily - 7 x weekly - 1 sets - 10 reps  - Supine Posterior Pelvic Tilt  - 1 x daily - 7 x weekly - 2 sets - 10 reps  - Small Range Straight Leg Raise  - 1 x daily - 7 x weekly - 2 sets - 10 reps  - Supine Bridge with Mini Swiss Ball Between Knees  - 1 x daily - 7 x weekly - 2 sets - 10 reps  - Prone Hip Extension  - 1 x daily - 7 x weekly - 2 sets - 10 reps  - Bird Dog  - 1 x daily - 7 x weekly - 1 sets - 10 reps  - Seated Sciatic Tensioner  - 1 x daily - 7 x weekly - 1 sets - 10 reps - 3-5 hold

## 2023-12-18 ENCOUNTER — APPOINTMENT (OUTPATIENT)
Dept: PHYSICAL THERAPY | Facility: CLINIC | Age: 28
End: 2023-12-18
Payer: COMMERCIAL

## 2023-12-27 ENCOUNTER — OFFICE VISIT (OUTPATIENT)
Dept: PHYSICAL THERAPY | Facility: CLINIC | Age: 28
End: 2023-12-27
Payer: COMMERCIAL

## 2023-12-27 DIAGNOSIS — M54.42 ACUTE LEFT-SIDED LOW BACK PAIN WITH LEFT-SIDED SCIATICA: Primary | ICD-10-CM

## 2023-12-27 PROCEDURE — 97110 THERAPEUTIC EXERCISES: CPT

## 2023-12-27 PROCEDURE — 97112 NEUROMUSCULAR REEDUCATION: CPT

## 2023-12-27 PROCEDURE — 97140 MANUAL THERAPY 1/> REGIONS: CPT

## 2023-12-27 NOTE — PROGRESS NOTES
"Daily Note     Today's date: 2023  Patient name: Jair Francis  : 1995  MRN: [id number]  Referring provider: Lul Stoner*  Dx:   Encounter Diagnosis     ICD-10-CM    1. Acute left-sided low back pain with left-sided sciatica  M54.42                      Subjective: Patient notes 4/10 low back and L buttock pain at beginning of session. He notes since beginning PT and HEP, he has noticed increased strength and less pain in the back itself, but he continues with the sciatica symptoms in the L glute area.      Objective: See treatment diary below      Assessment: Tolerated treatment well. Moderate relief noted following STM. Patient required moderate verbal cues to perform exercises with appropriate technique and intensity. Patient would benefit from continued PT to increase core strength and trunk mobility for improved function in ADLs.      Plan: Continue per plan of care.      Precautions: NONE     Daily Treatment Diary:      Initial Evaluation Date: 23  Compliance                  Visit Number 1 2  3                 Re-Eval  IE --                MC   Foto Captured Y -                                           Manual                           L L/S STM 10 min                                                             Ther-Ex                      TA with PPT x10  1 min  5\"x15                 TA with SLR x10  x15  x15                 Add with bridge x10  on p-ball x15  on p-ball x15                 Piriformis stretch 20\" x4  20\"x4  20\"x4                 Sciatic neural glide X10 3\" hold  x10 3 sec/ x5 10\" hold x10 3 sec/ x5 10\" hold                 LTR   5\" hold x10  5\"x10                 Prone hip ext x10  x10  x10                 Multifidus set                      Prone press up  10\" x5 10\"x5          [place] - Lat pull down   20# 2x10  -                 Upright bike   8 min  8 min                 Neuro Re-Ed                      Cat/ cow   x10 x15   "               Bird dog x10 x10 x15                       Paloff Press  Blue x15 -          Dying bug  With p-ball x10 Ball crunch 10x          Prone alt UE/LE lift  x10 x10          Ther-Act                                                               Modalities                                                                          Access Code: [email or web address]  URL: [email or web address]  Date: 11/28/2023  Prepared by: Imelda Hernandez    Exercises  - Supine Figure 4 Piriformis Stretch  - 1 x daily - 7 x weekly - 1 sets - 5 reps - 30 hold  - Seated Piriformis Stretch with Trunk Bend  - 1 x daily - 7 x weekly - 1 sets - 5 reps - 30 hold  - Supine Sciatic Nerve Glide  - 1 x daily - 7 x weekly - 2 sets - 10 reps - 3-5 hold  - Piriformis Mobilization on Foam Roll  - 1 x daily - 7 x weekly - 1 sets - 10 reps  - Supine Posterior Pelvic Tilt  - 1 x daily - 7 x weekly - 2 sets - 10 reps  - Small Range Straight Leg Raise  - 1 x daily - 7 x weekly - 2 sets - 10 reps  - Supine Bridge with Mini Swiss Ball Between Knees  - 1 x daily - 7 x weekly - 2 sets - 10 reps  - Prone Hip Extension  - 1 x daily - 7 x weekly - 2 sets - 10 reps  - Bird Dog  - 1 x daily - 7 x weekly - 1 sets - 10 reps  - Seated Sciatic Tensioner  - 1 x daily - 7 x weekly - 1 sets - 10 reps - 3-5 hold

## 2024-01-08 ENCOUNTER — OFFICE VISIT (OUTPATIENT)
Dept: PHYSICAL THERAPY | Facility: CLINIC | Age: 29
End: 2024-01-08
Payer: COMMERCIAL

## 2024-01-08 DIAGNOSIS — M54.42 ACUTE LEFT-SIDED LOW BACK PAIN WITH LEFT-SIDED SCIATICA: Primary | ICD-10-CM

## 2024-01-08 PROCEDURE — 97110 THERAPEUTIC EXERCISES: CPT

## 2024-01-08 PROCEDURE — 97112 NEUROMUSCULAR REEDUCATION: CPT

## 2024-01-08 NOTE — PROGRESS NOTES
"Daily Note     Today's date: 2024  Patient name: Jair Francis  : 1995  MRN: [id number]  Referring provider: Lul Stoner*  Dx:   Encounter Diagnosis     ICD-10-CM    1. Acute left-sided low back pain with left-sided sciatica  M54.42           Start Time: 1255  Stop Time: 1345  Total time in clinic (min): 50 minutes    Subjective: Patient reports that back has been feeling good over the holidays and since starting PT. HE notes that he has had minimal low back pain, only occurring with lifting heavier objects or quick jerky motions. He also notes no radiating symptoms into glutes in the past 2 weeks.      Objective: See treatment diary below      Assessment: Tolerated treatment well. Patient exhibited good technique with therapeutic exercises with  of pelvis throughout all core stabilization exercises. Introduced spinal flexion exercises with increased range and emphasis on control throughout. Patient reports he has been able to reintroduce weight lifting without exacerbating symptoms. Plan to see patient for follow up in 2 weeks and assess tolerance to newly introduced exercises before providing final HEP.      Plan: Continue per plan of care.      Precautions: NONE     Daily Treatment Diary:      Initial Evaluation Date: 23  Compliance                Visit Number 1 2  3  4               Re-Eval  IE --                MC   Foto Captured Y -                                         Manual                           L L/S STM 10 min                                                             Ther-Ex                      TA with PPT x10  1 min  5\"x15  5\"x15               TA with SLR x10  x15  x15  x15               Add with bridge x10  on p-ball x15  on p-ball x15  on p-ball  x15               Piriformis stretch 20\" x4  20\"x4  20\"x4  4x30\"               Sciatic neural glide X10 3\" hold  x10 3 sec/ x5 10\" hold x10 3 sec/ x5 10\" hold  " "x10 3 sec/ x5 10\" hold               LTR   5\" hold x10  5\"x10  5\"x10               Prone hip ext x10  x10  x10  2x10 ea LE               Multifidus set                      Prone press up  10\" x5 10\"x5 10\"x5         Lee - Lat pull down   20# 2x10  -                 Upright bike   8 min  8 min  8 min               Neuro Re-Ed                      Cat/ cow   x10 x15  2 x15               Bird dog x10 x10 x15 2 x15                      Paloff Press  Blue x15 -          Dying bug  With p-ball x10 Ball crunch 10x Ball crunch   10x         Prone alt UE/LE lift  x10 x10 x10         Damon curl    2x5          squats    2x10         Pball 3 ways stretch    10x5\"                      Ther-Act                                                               Modalities                                                                          Access Code: [email or web address]  URL: [email or web address]  Date: 11/28/2023  Prepared by: Imelda Hernandez    Exercises  - Supine Figure 4 Piriformis Stretch  - 1 x daily - 7 x weekly - 1 sets - 5 reps - 30 hold  - Seated Piriformis Stretch with Trunk Bend  - 1 x daily - 7 x weekly - 1 sets - 5 reps - 30 hold  - Supine Sciatic Nerve Glide  - 1 x daily - 7 x weekly - 2 sets - 10 reps - 3-5 hold  - Piriformis Mobilization on Foam Roll  - 1 x daily - 7 x weekly - 1 sets - 10 reps  - Supine Posterior Pelvic Tilt  - 1 x daily - 7 x weekly - 2 sets - 10 reps  - Small Range Straight Leg Raise  - 1 x daily - 7 x weekly - 2 sets - 10 reps  - Supine Bridge with Mini Swiss Ball Between Knees  - 1 x daily - 7 x weekly - 2 sets - 10 reps  - Prone Hip Extension  - 1 x daily - 7 x weekly - 2 sets - 10 reps  - Bird Dog  - 1 x daily - 7 x weekly - 1 sets - 10 reps  - Seated Sciatic Tensioner  - 1 x daily - 7 x weekly - 1 sets - 10 reps - 3-5 hold               "

## 2024-01-22 ENCOUNTER — APPOINTMENT (OUTPATIENT)
Dept: PHYSICAL THERAPY | Facility: CLINIC | Age: 29
End: 2024-01-22
Payer: COMMERCIAL

## 2024-03-28 DIAGNOSIS — Z30.2 REQUEST FOR STERILIZATION: Primary | ICD-10-CM

## 2024-03-28 NOTE — TELEPHONE ENCOUNTER
Called to speak with patient but he was unavailable at this time. Will call back tomorrow regarding upcoming appointment.

## 2024-04-02 NOTE — TELEPHONE ENCOUNTER
Spoke with patient and reviewed upcoming instructions for his vasectomy scheduled for 4/4/24. Made him aware that he needs to have a  to and from his procedure, needs to take his valium an hour before his procedure. Also needs to shave. Patient verbalized understanding of instructions. Order has been pended for valium to be sent to the [place] pharmacy. Please sign order so patient can  the medication.

## 2024-04-03 RX ORDER — DIAZEPAM 10 MG/1
10 TABLET ORAL EVERY 6 HOURS PRN
Qty: 1 TABLET | Refills: 0 | Status: SHIPPED | OUTPATIENT
Start: 2024-04-03

## 2024-06-11 ENCOUNTER — OFFICE VISIT (OUTPATIENT)
Dept: FAMILY MEDICINE CLINIC | Facility: CLINIC | Age: 29
End: 2024-06-11
Payer: COMMERCIAL

## 2024-06-11 ENCOUNTER — APPOINTMENT (OUTPATIENT)
Dept: LAB | Facility: CLINIC | Age: 29
End: 2024-06-11
Payer: COMMERCIAL

## 2024-06-11 ENCOUNTER — TELEPHONE (OUTPATIENT)
Dept: ADMINISTRATIVE | Facility: OTHER | Age: 29
End: 2024-06-11

## 2024-06-11 VITALS
WEIGHT: 157.41 LBS | BODY MASS INDEX: 23.93 KG/M2 | SYSTOLIC BLOOD PRESSURE: 128 MMHG | HEART RATE: 81 BPM | TEMPERATURE: 97.6 F | DIASTOLIC BLOOD PRESSURE: 73 MMHG | OXYGEN SATURATION: 100 %

## 2024-06-11 DIAGNOSIS — G47.63 BRUXISM, SLEEP-RELATED: ICD-10-CM

## 2024-06-11 DIAGNOSIS — M17.12 PRIMARY OSTEOARTHRITIS OF LEFT KNEE: ICD-10-CM

## 2024-06-11 DIAGNOSIS — Z11.4 SCREENING FOR HIV (HUMAN IMMUNODEFICIENCY VIRUS): ICD-10-CM

## 2024-06-11 DIAGNOSIS — M26.69 CREPITUS OF LEFT TMJ ON OPENING OF JAW: ICD-10-CM

## 2024-06-11 DIAGNOSIS — J30.2 SEASONAL ALLERGIES: ICD-10-CM

## 2024-06-11 DIAGNOSIS — R05.3 CHRONIC COUGH: ICD-10-CM

## 2024-06-11 DIAGNOSIS — Z00.00 ANNUAL PHYSICAL EXAM: Primary | ICD-10-CM

## 2024-06-11 PROBLEM — Z30.2 REQUEST FOR STERILIZATION: Status: RESOLVED | Noted: 2023-08-04 | Resolved: 2024-06-11

## 2024-06-11 LAB
HIV 1+2 AB+HIV1 P24 AG SERPL QL IA: NORMAL
HIV 2 AB SERPL QL IA: NORMAL
HIV1 AB SERPL QL IA: NORMAL
HIV1 P24 AG SERPL QL IA: NORMAL

## 2024-06-11 PROCEDURE — 36415 COLL VENOUS BLD VENIPUNCTURE: CPT

## 2024-06-11 PROCEDURE — 99395 PREV VISIT EST AGE 18-39: CPT | Performed by: PHYSICIAN ASSISTANT

## 2024-06-11 PROCEDURE — 99213 OFFICE O/P EST LOW 20 MIN: CPT | Performed by: PHYSICIAN ASSISTANT

## 2024-06-11 PROCEDURE — 87389 HIV-1 AG W/HIV-1&-2 AB AG IA: CPT

## 2024-06-11 RX ORDER — BENZONATATE 100 MG/1
100 CAPSULE ORAL 3 TIMES DAILY PRN
Qty: 20 CAPSULE | Refills: 0 | Status: SHIPPED | OUTPATIENT
Start: 2024-06-11

## 2024-06-11 NOTE — TELEPHONE ENCOUNTER
----- Message from Terrie SNYDER sent at 6/11/2024  9:03 AM EDT -----  Regarding: Care Gap Request  06/11/24 9:03 AM    Hello, our patient No patient name on file. has had Hepatitis C completed/performed. Please assist in updating the patient chart by pulling the Care Everywhere (CE) document. The date of service is 05/11/2017.     Thank you,  Terrie Carranza  [facility]

## 2024-06-11 NOTE — PATIENT INSTRUCTIONS
For the patient's bruxism or tooth grinding I am recommending an oral be nightguard for him.    For his seasonal allergies he is going to use the generic Flonase, Rhinocort, or Nasacort.  He needs to aim the nozzle toward his ear on both sides and follow the directions as far as how he squirts and having times per day.  If he is tasting this nasal steroid it means he is not aiming it as far laterally as he could.    He is doing very well from a physical standpoint and I will not need to see him for many years since he had his annual physical today.  Of course, I am here to see him if he has any acute care needs.

## 2024-06-11 NOTE — PROGRESS NOTES
Assessment/Plan:       1. Annual physical exam  2. Screening for HIV (human immunodeficiency virus)  -     HIV 1/2 AG/AB w Reflex [facility] for 2 yr old and above; Future  3. Seasonal allergies  4. Primary osteoarthritis of left knee  5. Chronic cough  -     benzonatate (TESSALON PERLES) 100 mg capsule; Take 1 capsule (100 mg total) by mouth 3 (three) times a day as needed for cough  6. 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 Bruxism, sleep-related      This 28-year-old male is seen at his request for any acute care visit.  When we were discussing his acute complaints, it was noted that he is overdue for a physical exam and he was willing to have his annual physical done as part of today's visit.    For the past 3 months, patient has had increasing coughing with some sputum production.  His coughing is relentless.  He was taking Robitussin without affecting the cough.  His cough often is worse when laying down.  He is not having any fever or chills.    Patient has had longstanding problems with seasonal allergies.  He is taking Zyrtec but this is not really helping.  As part of shared decision making, patient is willing to start taking intranasal corticosteroids.  He was counseled for the appropriate method for  using this product.  He can continue to take his Zyrtec as there is no reason that he cannot use 2 products simultaneously.    Patient and his wife had their third baby in March of this year.  He was breech and delivered via .  At the conclusion of the , patient had a tubal ligation.  Patient therefore did not need to follow through on getting a vasectomy since permanent sterilization was already accomplished.    In order to break the cough habit cycle, patient is also going to take a short course of benzonatate or Tessalon Perles for this chronic coughing.    Patient is willing to have his one-time HIV test and he will be getting this in the office.    A total of 30 minutes was spent  rendering care for this patient.  This time included review of the patient's electronic medical record, performing the history and physical, reviewing appropriate labs and/or images, developing a treatment and assessment plan, answering patient's questions and concerns, and documenting the patient visit.  I will see the patient in 1 year for his annual exam.  Happy to see him in the interim.  Subjective:      Patient ID: Jair Francis is a 28 y.o. male.    HPI: 28-year-old male with prolonged symptoms related to his seasonal allergies.  Coughing continues.  He is producing a scant amount of sputum but he does feel a lot of postnasal drainage going down the back of his throat.    Patient continues to have changes in his bowel habits alternating between diarrhea and constipation.  This is a chronic ongoing problem for him.  This could be related to irritable bowel syndrome.    He is not having any fever or chills he denies hemoptysis.  He denies pain in his chest or heart palpitations.  No difficulty passing his water.    Patient does have ongoing pain in his left knee.  The pain occurs with activity and recovers with rest.  He is also taking a supplement that has collagen and this seems to be helping his ongoing knee pain.    The following portions of the patient's history were reviewed and updated as appropriate: allergies, current medications, past family history, past medical history, past social history, past surgical history, and problem list.    Review of Systems   having abrupt upper respiratory tract symptoms or cold.    Objective:      /73 (BP Location: Right arm, Patient Position: Sitting, Cuff Size: Standard)   Pulse 81   Temp 97.6 °F (36.4 °C) (Tympanic)   Wt 71.4 kg (157 lb 6.5 oz)   SpO2 100%   BMI 23.93 kg/m²          Physical Exam reviewed vital signs.  He is normotensive.  He is pleasant and cooperative.    Well-developed well-nourished 28 y.o. year old male who is cooperative with the exam.   Patient is alert and oriented x3.  Patient is appropriate in answering all questions.    HEENT:  Normocephalic.  PERRLA.  EOMs intact.  TMs are clear with identification of bony landmarks.  He has some scar tissue consistent with an old perforation on the left TM.  On the Bal test, he does have increased sounds on the left side.  Brenda test showed AC greater than BC bilaterally.  No tragus or pinnae tenderness.  No pre or posterior auricular adenopathy.  Sinuses without tenderness.  Throat without hyperemia.  Patient has palpable click on the left side consistent with left TMJ.  He does have a history of bruxism and I have suggested that he use a nightguard for this.  Neck:  Supple without adenopathy.  Thyroid midline without thyromegaly or bruits.  No carotid bruits.  Chest symmetric and nontender.  Heart regular rate and rhythm.  No murmur rubs or gallops.  Point of maximum impulse not displaced.  Lungs are clear to auscultation.  Breathing is nonlabored.  Aerating bases well.  Abdomen round and soft positive bowel sounds without masses tenderness or organomegaly.  Extremities reveal adequate peripheral pulses without peripheral edema.

## 2024-06-11 NOTE — TELEPHONE ENCOUNTER
Upon review of the In Basket request we were able to locate, review, and update the patient chart as requested for Hepatitis C .    Any additional questions or concerns should be emailed to the Practice Liaisons via the appropriate education email address, please do not reply via In Basket.    Thank you  Ricardo Orr MA   [facility]

## 2025-01-30 ENCOUNTER — OFFICE VISIT (OUTPATIENT)
Dept: FAMILY MEDICINE CLINIC | Facility: CLINIC | Age: 30
End: 2025-01-30
Payer: COMMERCIAL

## 2025-01-30 VITALS
TEMPERATURE: 96.9 F | BODY MASS INDEX: 23.42 KG/M2 | WEIGHT: 154.54 LBS | SYSTOLIC BLOOD PRESSURE: 127 MMHG | HEART RATE: 74 BPM | HEIGHT: 68 IN | DIASTOLIC BLOOD PRESSURE: 74 MMHG | OXYGEN SATURATION: 99 %

## 2025-01-30 DIAGNOSIS — B35.4 TINEA CORPORIS: ICD-10-CM

## 2025-01-30 DIAGNOSIS — B35.0 TINEA CAPITIS: Primary | ICD-10-CM

## 2025-01-30 DIAGNOSIS — R04.0 ANTERIOR EPISTAXIS: ICD-10-CM

## 2025-01-30 DIAGNOSIS — L29.9 PRURITUS: ICD-10-CM

## 2025-01-30 DIAGNOSIS — L30.8 OTHER ECZEMA: ICD-10-CM

## 2025-01-30 PROCEDURE — 99213 OFFICE O/P EST LOW 20 MIN: CPT | Performed by: PHYSICIAN ASSISTANT

## 2025-01-30 RX ORDER — ITRACONAZOLE 100 MG/1
100 CAPSULE ORAL DAILY
Qty: 14 CAPSULE | Refills: 0 | Status: SHIPPED | OUTPATIENT
Start: 2025-01-30 | End: 2025-02-13

## 2025-01-30 NOTE — PATIENT INSTRUCTIONS
For itching at night, take 2 Zyrtec capsules or pills and not the Zyrtec D which is a decongestant that will keep you up.  Also take 1 Pepcid or famotidine which is 20 mg at night.  Over the next several nights this should be effective.  Selsun Blue for the scalp and oral itraconazole 1 pill daily for 2 weeks.

## 2025-07-08 ENCOUNTER — OFFICE VISIT (OUTPATIENT)
Dept: FAMILY MEDICINE CLINIC | Facility: CLINIC | Age: 30
End: 2025-07-08
Payer: COMMERCIAL

## 2025-07-08 VITALS
BODY MASS INDEX: 23.9 KG/M2 | SYSTOLIC BLOOD PRESSURE: 104 MMHG | HEART RATE: 70 BPM | WEIGHT: 157.19 LBS | DIASTOLIC BLOOD PRESSURE: 60 MMHG | OXYGEN SATURATION: 98 % | TEMPERATURE: 98.1 F

## 2025-07-08 DIAGNOSIS — J30.2 SEASONAL ALLERGIES: ICD-10-CM

## 2025-07-08 DIAGNOSIS — Z13.9 SCREENING DUE: ICD-10-CM

## 2025-07-08 DIAGNOSIS — M17.12 PRIMARY OSTEOARTHRITIS OF LEFT KNEE: ICD-10-CM

## 2025-07-08 DIAGNOSIS — M26.69 CREPITUS OF LEFT TMJ ON OPENING OF JAW: ICD-10-CM

## 2025-07-08 DIAGNOSIS — Z00.00 ANNUAL PHYSICAL EXAM: Primary | ICD-10-CM

## 2025-07-08 PROCEDURE — 99395 PREV VISIT EST AGE 18-39: CPT | Performed by: PHYSICIAN ASSISTANT

## 2025-07-08 PROCEDURE — 99213 OFFICE O/P EST LOW 20 MIN: CPT | Performed by: PHYSICIAN ASSISTANT

## 2025-07-08 NOTE — ASSESSMENT & PLAN NOTE
Patient states that he is not having as much pain in the left knee since he is not exercising quite as much.  He did sprain his left ankle swells intermittently.  He is currently not having pain but he thinks it is because he is not overusing his joints.  He remains interested in continuing to hike and take long walks.

## 2025-07-08 NOTE — ASSESSMENT & PLAN NOTE
Patient takes an allergy pill at night and this remedy says problems.  Mentions that he was dissatisfied with the medication that he would be able to use a nasal corticosteroid which we talked about previously.

## 2025-07-08 NOTE — PROGRESS NOTES
Name: Jair Francis      : 1995      MRN: [id number]  Encounter Provider: Sheri Bay PA-C  Encounter Date: 2025   Encounter department: [facility]  :  Assessment & Plan  Annual physical exam  Annual examination completed today.  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  He would like to have some additional time so that he is able to have more time for exercise.  He continues to enjoy his job.  Work is not as busy as it has been now that the holiday season is over.  Family history is significant only for Parkinson's disease in her grandfather.  Parents are alive and well.  We are going to do some baseline labs as he has not had labs for more than a year.  Orders:    Lipid panel; Future    Basic metabolic panel; Future    Primary osteoarthritis of left knee  Patient states that he is not having as much pain in the left knee since he is not exercising quite as much.  He did sprain his left ankle swells intermittently.  He is currently not having pain but he thinks it is because he is not overusing his joints.  He remains interested in continuing to hike and take long walks.       Seasonal allergies  Patient takes an allergy pill at night and this remedy says problems.  Mentions that he was dissatisfied with the medication that he would be able to use a nasal corticosteroid which we talked about previously.       Screening due  Lipid and basic metabolic profile being ordered as baseline.  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  He has a history of bruxism but has not yet gotten a nightguard.  He was encouraged to use a nightguard to prevent dental erosion.              History of Present Illness   HPI: 29-year-old male sees me for his primary care services.  Patient is very healthy.  He tries to avoid  junk food.  He is active but would like to formally exercise to a greater extent in the future.    No pain in his chest heart palpitations dizziness or lightheadedness.  No syncope or near syncope.  No changes in his bowel or bladder habits.  Review of Systems: No recent URI or viral syndrome.  Seasonal allergies are well-controlled.    Objective   /60 (BP Location: Left arm, Patient Position: Sitting, Cuff Size: Standard)   Pulse 70   Temp 98.1 °F (36.7 °C) (Tympanic)   Wt 71.3 kg (157 lb 3 oz)   SpO2 98%   BMI 23.90 kg/m²      Physical Exam: Reviewed vital signs.  He is normotensive and afebrile.    Well-developed well-nourished 29 y.o. year old male who is cooperative with the exam.  Patient is alert and oriented x3.  Patient is appropriate in answering all questions.    HEENT:  Normocephalic.  PERRLA.  EOMs intact.  TMs are clear with identification of bony landmarks.  No tragus or pinnae tenderness.  No pre or posterior auricular adenopathy.  Sinuses without tenderness.  Throat without hyperemia.  Neck:  Supple without adenopathy.  Thyroid midline without thyromegaly.  Bal test lateralized to the right.  Brenda test showed AC greater than BC bilaterally.  Chest symmetric and nontender.  Heart regular rate and rhythm.  No murmur rubs or gallops.  Point of maximum impulse not displaced.  Lungs are clear to auscultation.  Breathing is nonlabored.  Aerating bases well.  Abdomen round and soft positive bowel sounds without masses tenderness or organomegaly.  Extremities reveal adequate peripheral pulses without peripheral edema.  Administrative Statements   I have spent a total time of 30 minutes in caring for this patient on the day of the visit/encounter including Impressions, Counseling / Coordination of care, Documenting in the medical record, Reviewing/placing orders in the medical record (including tests, medications, and/or procedures), and Obtaining or reviewing history      He will get his BMP  and lipid panel as baseline.  I will be in contact with him for any abnormalities.  I will see him in a year for his annual exam..